# Patient Record
Sex: MALE | Race: BLACK OR AFRICAN AMERICAN | Employment: PART TIME | URBAN - METROPOLITAN AREA
[De-identification: names, ages, dates, MRNs, and addresses within clinical notes are randomized per-mention and may not be internally consistent; named-entity substitution may affect disease eponyms.]

---

## 2019-09-11 ENCOUNTER — HOSPITAL ENCOUNTER (EMERGENCY)
Age: 19
Discharge: HOME | End: 2019-09-11
Attending: EMERGENCY MEDICINE | Admitting: EMERGENCY MEDICINE
Payer: MEDICAID

## 2019-09-11 VITALS
HEIGHT: 69 IN | DIASTOLIC BLOOD PRESSURE: 77 MMHG | BODY MASS INDEX: 28.13 KG/M2 | WEIGHT: 189.9 LBS | OXYGEN SATURATION: 99 % | TEMPERATURE: 98.5 F | SYSTOLIC BLOOD PRESSURE: 126 MMHG | HEART RATE: 65 BPM | RESPIRATION RATE: 16 BRPM

## 2019-09-11 DIAGNOSIS — J06.9 VIRAL URI: Primary | ICD-10-CM

## 2019-09-11 PROCEDURE — 99282 EMERGENCY DEPT VISIT SF MDM: CPT

## 2019-09-11 NOTE — ED PROVIDER NOTES
EMERGENCY DEPARTMENT HISTORY AND PHYSICAL EXAM    4:36 PM      Date: 9/11/2019  Patient Name: Nathalie Castro    History of Presenting Illness     Chief Complaint   Patient presents with    Nasal Congestion       History Provided By: Patient    Chief Complaint: nasal congestion, vomiting x 2, body aches, rhinorrhea, hot flashes  Duration: 1 Hours  Timing:  Acute  Location:   Quality: Aching  Severity: Moderate  Modifying Factors: no improvement with motrin and cold medication  Associated Symptoms: denies any other associated signs or symptoms      Additional History (Context):Itz Tanner is a 20 y.o. male who presents to the emergency department for evaluation of body aches, vomiting x 2, hot flashes, nasal congestion since awakening this morning. Took OTC ibuprofen and cold medication. Needs a work note as he works in [employer]. PCP:  None      Past History     Past Medical History:  History reviewed. No pertinent past medical history. Past Surgical History:  History reviewed. No pertinent surgical history. Family History:  History reviewed. No pertinent family history. Social History:  Social History     Tobacco Use    Smoking status: Never Smoker    Smokeless tobacco: Never Used   Substance Use Topics    Alcohol use: Never     Frequency: Never    Drug use: Never       Allergies: Allergies no known allergies      Review of Systems       Review of Systems   Constitutional: Positive for diaphoresis. Negative for chills and fever. HENT: Positive for congestion and rhinorrhea. Negative for sore throat. Respiratory: Negative for cough and shortness of breath. Cardiovascular: Negative for chest pain. Gastrointestinal: Positive for nausea and vomiting. Negative for abdominal pain, blood in stool, constipation and diarrhea. Genitourinary: Negative for dysuria, frequency and hematuria. Musculoskeletal: Positive for myalgias. Negative for back pain.    Skin: Negative for rash and wound. Neurological: Negative for dizziness and headaches. All other systems reviewed and are negative. Physical Exam     Visit Vitals  /77   Pulse 65   Temp 98.5 °F (36.9 °C)   Resp 16   Ht 5' 9\" (1.753 m)   Wt 86.1 kg (189 lb 14.4 oz)   SpO2 99%   BMI 28.04 kg/m²         Physical Exam   Constitutional: He is oriented to person, place, and time. He appears well-developed and well-nourished. No distress. HENT:   Head: Normocephalic and atraumatic. Mouth/Throat: No oropharyngeal exudate. Erythematous posterior oropharynx without exudates or edema. Bilateral nasal turbinates are erythematous and edematous with rhinorrhea. Eyes: Pupils are equal, round, and reactive to light. Conjunctivae and EOM are normal. Right eye exhibits no discharge. Neck: Normal range of motion. Neck supple. No thyromegaly present. Cardiovascular: Normal rate, regular rhythm and normal heart sounds. Pulmonary/Chest: Effort normal and breath sounds normal. No respiratory distress. He has no wheezes. He has no rales. He exhibits no tenderness. Lungs clear to auscultation bilaterally   Musculoskeletal: Normal range of motion. He exhibits no edema or deformity. Lymphadenopathy:     He has no cervical adenopathy. Neurological: He is alert and oriented to person, place, and time. Skin: Skin is warm and dry. He is not diaphoretic. Psychiatric: He has a normal mood and affect. Nursing note and vitals reviewed. Diagnostic Study Results     Labs -  No results found for this or any previous visit (from the past 12 hour(s)). Radiologic Studies -   No results found. Medical Decision Making   I am the first provider for this patient. I reviewed the vital signs, available nursing notes, past medical history, past surgical history, family history and social history. Vital Signs-Reviewed the patient's vital signs.     Pulse Oximetry Analysis -  99% on room air (Interpretation)    Records Reviewed: Nursing Notes (Time of Review: 4:36 PM)    ED Course: Progress Notes, Reevaluation, and Consults:    Provider Notes (Medical Decision Making):   Differential Diagnosis: unlikely influenza, viral URI, allergic rhinitis      Plan: Patient presents ambulatory in no significant distress with normal vitals. Exam and HPI consistent with uncomplicated viral URI. Patient advised on supportive care. Work note provided per request.    At this time, patient is stable and appropriate for discharge home. Patient demonstrates understanding of current diagnoses and is in agreement with the treatment plan. They are advised that while the likelihood of serious underlying condition is low at this point given the evaluation performed today, we cannot fully rule it out. They are advised to immediately return with any new symptoms or worsening of current condition. All questions have been answered. Patient is given educational material regarding their diagnoses, including danger symptoms and when to return to the ED. Diagnosis     Clinical Impression:   1. Viral URI        Disposition: DC Home    Follow-up Information     Follow up With Specialties Details Why Contact Anabell Madrid  Call in 2 days To discuss establishing primary care [facility] [phone]    [facility] EMERGENCY DEPT Emergency Medicine Go to If symptoms worsen, As needed [id number]  [phone]           Patient's Medications    No medications on file     _______________________________    This note was dictated utilizing voice recognition software which may lead to typographical errors. I apologize in advance if the situation occurs. If questions arise please do not hesitate to contact me or call our department.   Kevin Castro PA-C

## 2019-09-11 NOTE — LETTER
NOTIFICATION OF RETURN TO WORK 
 
9/11/2019 4:40 PM 
 
Mr. Evelyn Louie No address on file. . 
 
To Whom It May Concern: 
 
Evelyn Louie was under the care of [facility] EMERGENCY DEPT. He will be able to return to work on Saturday, 9/14/19. If there are questions or concerns please have the patient contact our office. Sincerely, Gigi Willis PA-C

## 2019-09-11 NOTE — DISCHARGE INSTRUCTIONS
Patient Education     Please return immediately to the Emergency Room for re-evaluation if you are not improving, develop any new symptoms, or develop worsening of current symptoms! If you have been prescribed a medication and are unable to take this medication for any reason, please return to the Emergency Department for further evaluation! If you have been referred for follow-up to a specialist, but are unable to follow-up and your symptoms are either not improving or are worsening, please return to the Emergency Department for further evaluation! Upper Respiratory Infection (Cold): Care Instructions  Your Care Instructions    An upper respiratory infection, or URI, is an infection of the nose, sinuses, or throat. URIs are spread by coughs, sneezes, and direct contact. The common cold is the most frequent kind of URI. The flu and sinus infections are other kinds of URIs. Almost all URIs are caused by viruses. Antibiotics won't cure them. But you can treat most infections with home care. This may include drinking lots of fluids and taking over-the-counter pain medicine. You will probably feel better in 4 to 10 days. The doctor has checked you carefully, but problems can develop later. If you notice any problems or new symptoms, get medical treatment right away. Follow-up care is a key part of your treatment and safety. Be sure to make and go to all appointments, and call your doctor if you are having problems. It's also a good idea to know your test results and keep a list of the medicines you take. How can you care for yourself at home? · To prevent dehydration, drink plenty of fluids, enough so that your urine is light yellow or clear like water. Choose water and other caffeine-free clear liquids until you feel better. If you have kidney, heart, or liver disease and have to limit fluids, talk with your doctor before you increase the amount of fluids you drink.   · Take an over-the-counter pain medicine, such as acetaminophen (Tylenol), ibuprofen (Advil, Motrin), or naproxen (Aleve). Read and follow all instructions on the label. · Before you use cough and cold medicines, check the label. These medicines may not be safe for young children or for people with certain health problems. · Be careful when taking over-the-counter cold or flu medicines and Tylenol at the same time. Many of these medicines have acetaminophen, which is Tylenol. Read the labels to make sure that you are not taking more than the recommended dose. Too much acetaminophen (Tylenol) can be harmful. · Get plenty of rest.  · Do not smoke or allow others to smoke around you. If you need help quitting, talk to your doctor about stop-smoking programs and medicines. These can increase your chances of quitting for good. When should you call for help? Call 911 anytime you think you may need emergency care. For example, call if:    · You have severe trouble breathing.    Call your doctor now or seek immediate medical care if:    · You seem to be getting much sicker.     · You have new or worse trouble breathing.     · You have a new or higher fever.     · You have a new rash.    Watch closely for changes in your health, and be sure to contact your doctor if:    · You have a new symptom, such as a sore throat, an earache, or sinus pain.     · You cough more deeply or more often, especially if you notice more mucus or a change in the color of your mucus.     · You do not get better as expected. Where can you learn more? Go to http://jose-markel.info/. Enter T793 in the search box to learn more about \"Upper Respiratory Infection (Cold): Care Instructions. \"  Current as of: September 5, 2018  Content Version: 12.1  © 8604-1789 [employer]. Care instructions adapted under license by [employer] (which disclaims liability or warranty for this information).  If you have questions about a medical condition or this instruction, always ask your healthcare professional. [place] [id number] any warranty or liability for your use of this information.